# Patient Record
Sex: FEMALE | Race: WHITE
[De-identification: names, ages, dates, MRNs, and addresses within clinical notes are randomized per-mention and may not be internally consistent; named-entity substitution may affect disease eponyms.]

---

## 2023-03-24 ENCOUNTER — HOSPITAL ENCOUNTER (OUTPATIENT)
Dept: HOSPITAL 76 - DI | Age: 48
Discharge: HOME | End: 2023-03-24
Attending: NURSE PRACTITIONER
Payer: COMMERCIAL

## 2023-03-24 DIAGNOSIS — M25.572: Primary | ICD-10-CM

## 2023-03-24 DIAGNOSIS — M79.672: ICD-10-CM

## 2023-03-24 DIAGNOSIS — R60.0: ICD-10-CM

## 2023-03-24 NOTE — XRAY REPORT
PROCEDURE:  Foot 3 View LT

 

INDICATIONS:  PAIN IN LFT FOOT

 

TECHNIQUE:  3 views of the foot were acquired.  

 

COMPARISON:  None.

 

FINDINGS:  

 

Bones:  No acute fractures or dislocations.  Small ossification dorsal to the talonavicular joint is 
most likely sequela of remote prior trauma. No suspicious bony lesions.  

 

Soft tissues: Nonspecific soft tissue edema seen surrounding the ankle and hindfoot.

 

IMPRESSION:  

No acute osseous abnormality. If there is clinical concern or persistent symptoms, additional imaging
 such as repeat radiographs or advanced imaging (e.g. CT, MRI) may be helpful for further evaluation.


 

Reviewed by: Carlos Mcgee MD on 3/24/2023 4:44 PM PDT

Approved by: Carlos Mcgee MD on 3/24/2023 4:44 PM PDT

 

 

Station ID:  [id number]

## 2023-03-24 NOTE — XRAY REPORT
PROCEDURE:  Ankle 3 View LT

 

INDICATIONS:  PAIN OF LEFT ANKLE JOINT

 

TECHNIQUE:  3 views of the ankle were acquired.  

 

COMPARISON:  None.

 

FINDINGS:  

 

Bones:  No acute fractures or dislocations.  Small ossification dorsal to the talonavicular joint is 
likely the sequela of remote prior trauma. Ankle mortise is normally aligned.  No suspicious bony les
ions.  

 

Soft tissues: Soft tissue edema surrounding the ankle is most prominent over the lateral malleolus.

 

IMPRESSION:  Nonspecific soft tissue edema. No acute osseous abnormality. If there is clinical concer
n or persistent symptoms, additional imaging such as repeat radiographs or advanced imaging (e.g. CT,
 MRI) may be helpful for further evaluation.

 

Reviewed by: Carlos Mcgee MD on 3/24/2023 4:43 PM PDT

Approved by: Carlos Mcgee MD on 3/24/2023 4:43 PM PDT

 

 

Station ID:  [id number]